# Patient Record
Sex: MALE | Race: WHITE
[De-identification: names, ages, dates, MRNs, and addresses within clinical notes are randomized per-mention and may not be internally consistent; named-entity substitution may affect disease eponyms.]

---

## 2018-07-05 ENCOUNTER — HOSPITAL ENCOUNTER (EMERGENCY)
Dept: HOSPITAL 25 - ED | Age: 23
Discharge: HOME | End: 2018-07-05
Payer: COMMERCIAL

## 2018-07-05 VITALS — SYSTOLIC BLOOD PRESSURE: 128 MMHG | DIASTOLIC BLOOD PRESSURE: 81 MMHG
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
## 2018-07-05 DIAGNOSIS — S42.002A: Primary | ICD-10-CM
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
## 2018-07-05 PROCEDURE — 99282 EMERGENCY DEPT VISIT SF MDM: CPT
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
## 2018-07-11 ENCOUNTER — HOSPITAL ENCOUNTER (OUTPATIENT)
Dept: HOSPITAL 25 - OR | Age: 23
Discharge: HOME | End: 2018-07-11
Attending: ORTHOPAEDIC SURGERY
Payer: COMMERCIAL

## 2018-07-11 VITALS — SYSTOLIC BLOOD PRESSURE: 128 MMHG | DIASTOLIC BLOOD PRESSURE: 65 MMHG
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
## 2018-07-11 DIAGNOSIS — S42.022A: Primary | ICD-10-CM

## 2018-07-11 PROCEDURE — C1776 JOINT DEVICE (IMPLANTABLE): HCPCS

## 2018-07-11 PROCEDURE — C1713 ANCHOR/SCREW BN/BN,TIS/BN: HCPCS

## 2018-07-11 PROCEDURE — 76001: CPT

## 2018-07-11 NOTE — RAD
CPT II Codes: 





INDICATION: Left clavicle fracture, traumatic.



Fluoroscopic services provided for referring physician. 11.8 seconds of fluoroscopy time

was used. 7 spot images demonstrates internal fixation left clavicle fracture.



IMPRESSION: Internal fixation left clavicle fracture. Fluoroscopic services provided for

referring physician.

## 2020-02-20 ENCOUNTER — HOSPITAL ENCOUNTER (EMERGENCY)
Dept: HOSPITAL 25 - UCEAST | Age: 25
Discharge: HOME | End: 2020-02-20
Payer: COMMERCIAL

## 2020-02-20 VITALS — DIASTOLIC BLOOD PRESSURE: 88 MMHG | SYSTOLIC BLOOD PRESSURE: 143 MMHG

## 2020-02-20 DIAGNOSIS — J02.9: Primary | ICD-10-CM

## 2020-02-20 DIAGNOSIS — H92.09: ICD-10-CM

## 2020-02-20 DIAGNOSIS — J32.9: ICD-10-CM

## 2020-02-20 PROCEDURE — 87651 STREP A DNA AMP PROBE: CPT

## 2020-02-20 PROCEDURE — 87070 CULTURE OTHR SPECIMN AEROBIC: CPT

## 2020-02-20 PROCEDURE — 99212 OFFICE O/P EST SF 10 MIN: CPT

## 2020-02-20 PROCEDURE — G0463 HOSPITAL OUTPT CLINIC VISIT: HCPCS

## 2020-02-20 NOTE — XMS REPORT
Summary of Care

 Created on:2020



Patient:Mariano Pate

Sex:Male

:1995

External Reference #:[id number]





Demographics







 Address  [address] [place] [address]

 

 Mobile Phone  [phone]

 

 Home Phone  [phone]

 

 Work Phone  [phone]

 

 Preferred Language  English

 

 Marital Status  Not  or 

 

 Oriental orthodox Affiliation  Unknown

 

 Race  White

 

 Ethnic Group  Not  or 









Author







 Organization  [facility]

 

 Address  [address] [address]









Support







 Name  Relationship  Address  Phone

 

 Mecca Pate  Unavailable  Unavailable  Unavailable









Care Team Providers







 Name  Role  Phone

 

 None, Norrie  Primary Care Provider  Unavailable









Reason for Visit







 Reason  Comments

 

 Sleep Problem  unable to fall asleep







Encounter Details







 Date  Type  Department  Care Team  Description

 

 2020  Office Visit  Yamila Walters,  Sleep disturbance



     Practice



  FNP



  (Primary Dx)



     [address]



  [address] [place] [address]



  [place] [address]



  



     [phone] (Fax)  







Allergies

No Known Allergiesdocumented as of this encounter (statuses as of 2020)



Medications







 Medication  Sig  Dispensed  Refills  Start Date  End Date  Status

 

 trazodone (DESYREL) 50  Take 1 Tab by  30 Tab  0  2020    Active



 MG Oral  mouth EVERY          



 TabIndications: Sleep  BEDTIME AS NEEDED          



 disturbance  (sleep issue).          



documented as of this encounter (statuses as of 2020)



Active Problems

No known active problemsdocumented as of this encounter (statuses as of 2020)



Social History







 Tobacco Use  Types  Packs/Day  Years Used  Date

 

 Never Smoker        









 Smokeless Tobacco: Never Used      









 Alcohol Use  Drinks/Week  oz/Week  Comments

 

 Yes      









 Alcohol Habits  Answer  Date Recorded

 

 How often do you have a drink containing alcohol?  2-4 times a month  2020

 

 How many drinks containing alcohol do you have on a  7 to 9  2020



 typical day when you are drinking?    

 

 How often do you have six or more drinks on one  Not asked  



 occasion?    









 Sex Assigned at Birth  Date Recorded

 

 Not on file  









 Job Start Date  Occupation  Industry

 

 Not on file  Not on file  Not on file









 Travel History  Travel Start  Travel End









 No recent travel history available.



documented as of this encounter



Last Filed Vital Signs







 Vital Sign  Reading  Time Taken  Comments

 

 Blood Pressure  122/68  2020 10:13 AM EST  

 

 Pulse  105  2020 10:13 AM EST  

 

 Temperature  -  -  

 

 Respiratory Rate  -  -  

 

 Oxygen Saturation  98%  2020 10:13 AM EST  

 

 Inhaled Oxygen Concentration  -  -  

 

 Weight  83.5 kg (184 lb)  2020 10:13 AM EST  

 

 Height  -  -  

 

 Body Mass Index  -  -  



documented in this encounter



Patient Instructions

Patient InstructionsYamila Bennett FNP - 2020 10:00 AM ESTTrial 
Trazodone at bedtime for sleep - 1 hour before bedtime

Keep bedroom cool and dark

Limit stimulation before bed

Follow up with MD to establish

Call if any concerns

Electronically signed by Yamila Bennett FNP at 2020 10:28 AM EST

documented in this encounter



Progress Notes

Yamila Bennett FNP - 2020 10:00 AM ESTFormatting of this note might be 
different from the original.

PATIENT:  Mariano Pate

MRN:  [id number]

:  1995

DATE OF SERVICE:  2020



CHIEF COMPLAINT:

Chief Complaint

Patient presents with

 Sleep Problem

  unable to fall asleep



Subjective

HISTORY OF PRESENT ILLNESS:

Mariano Pate is a 24-y.o. male.

HPI

Sleep issues - ongoing for several years.



History reviewed. No pertinent past medical history.

History reviewed. No pertinent family history.

Current Outpatient Medications

Medication Sig

 trazodone (DESYREL) 50 MG Oral Tab Take 1 Tab by mouth EVERY BEDTIME AS 
NEEDED (sleep issue).



No current facility-administered medications for this visit.



No Known Allergies

Social History



Socioeconomic History

 Marital status: Single

  Spouse name: Not on file

 Number of children: Not on file

 Years of education: Not on file

 Highest education level: Not on file

Occupational History

 Not on file

Social Needs

 Financial resource strain: Not on file

 Food insecurity

  Worry: Not on file

  Inability: Not on file

 Transportation needs

  Medical: Not on file

  Non-medical: Not on file

Tobacco Use

 Smoking status: Never Smoker

 Smokeless tobacco: Never Used

Substance and Sexual Activity

 Alcohol use: Yes

  Frequency: 2-4 times a month

  Drinks per session: 7 to 9

 Drug use: Never

 Sexual activity: Yes

  Partners: Female

Lifestyle

 Physical activity

  Days per week: Not on file

  Minutes per session: Not on file

 Stress: Not on file

Relationships

 Social connections

  Talks on phone: Not on file

  Gets together: Not on file

  Attends Gnosticist service: Not on file

  Active member of club or organization: Not on file

  Attends meetings of clubs or organizations: Not on file

  Relationship status: Not on file

 Intimate partner violence

  Fear of current or ex partner: Not on file

  Emotionally abused: Not on file

  Physically abused: Not on file

  Forced sexual activity: Not on file

Other Topics Concern

 Not on file

Social History Narrative

 Not on file



Over the last 2 weeks, have you been feeling down, depressed, anxious, or 
hopeless?: 0

Over the past 2 weeks, have you felt little interest or pleasure in doing things
?: 0



REVIEW OF SYSTEMS:

Review of Systems

Constitutional: Negative for malaise/fatigue.

Psychiatric/Behavioral: Negative for depression, hallucinations, substance 
abuse and suicidal ideas.The patient has insomnia. The patient is not nervous/
anxious.



Objective

PHYSICAL EXAM:

VITALS:  /68  | Pulse 105  | Wt 184 lb (83.5 kg)  | SpO2 98%  There is no 
height or weight on file to calculate BMI.

Physical Exam

Vitals signs and nursing note reviewed.

Constitutional:

   General: He is not in acute distress.

   Appearance: Normal appearance.

HENT:

   Head: Normocephalic and atraumatic.

Skin:

   General: Skin is warm and dry.

Neurological:

   Mental Status: He is alert and oriented to person, place, and time.

Psychiatric:

   Mood and Affect: Mood normal. Mood is not anxious or depressed.

   Speech: Speech normal.

   Behavior: Behavior normal. Behavior is cooperative.

   Thought Content: Thought content normal. Thought content does not include 
homicidal or suicidal plan.

   Cognition and Memory: Cognition normal.

   Comments: Poor sleep for years - has used Melatonin - no help, OTC sleep 
aids - caused drowsinessnext day. Keeps bedroom dark and cool, does not drink 
coffee.

Can't shut down when goes to bed. Denies depression or anxiety.

Discussed medications - will try low dose Trazodone prior to sleep. Pt also 
advised to schedule appointment with MD to establish.









ASSESSMENT / IMPRESSION:

  ICD-9-CM ICD-10-CM

1. Sleep disturbance 780.50 G47.9 trazodone (DESYREL) 50 MG Oral Tab





  Plan

Trial Trazodone at bedtime for sleep - 1 hour before bedtime

Keep bedroom cool and dark

Limit stimulation before bed

Follow up with MD to establish

Call if any concerns





Author:  GUANAKITO Max 2020 10:30Electronically signed by Yamila Bennett FNP at 2020 10:30 AM ESTdocumented in this encounter



Plan of Treatment







 Health Maintenance  Due Date  Last Done  Comments

 

 DTaP/Tdap/Td Vaccines (1 - Tdap)  2007    

 

 DEPRESSION SCREENING  2008    

 

 HIV SCREENING  2011    

 

 INFLUENZA VACCINE (#1)  2019    

 

 HEPATITIS A IMMUNIZATION SERIES  Aged Out    No longer eligible based on



       patient's age to complete this



       topic

 

 HPV IMMUNIZATION SERIES  Aged Out    No longer eligible based on



       patient's age to complete this



       topic

 

 MENINGOCOCCAL VACCINE IMM  Aged Out    No longer eligible based on



       patient's age to complete this



       topic

 

 PNEUMOCOCCAL 0-64 YRS  Aged Out    No longer eligible based on



       patient's age to complete this



       topic



documented as of this encounter



Results

Not on filedocumented in this encounter



Visit Diagnoses







 Diagnosis

 

 Sleep disturbance







 Sleep disturbance, unspecified



documented in this encounter



Insurance







 Payer  Benefit Plan /  Subscriber ID  Effective Dates  Phone  Address  Type



   Group          

 

 [employer]  [employer] [facility]  xxxxxxxxxxx  2018-Present      [employer]









 Guarantor Name  Account Type  Relation to  Date of Birth  Phone  Billing



     Patient      Address

 

 Mariano Pate  Personal/Family    1995  [phone]  [address]



         (Home)



  [address]







         [phone]  [place], [place]



         (Work)  [id number]



documented as of this encounter
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
